# Patient Record
Sex: MALE | URBAN - METROPOLITAN AREA
[De-identification: names, ages, dates, MRNs, and addresses within clinical notes are randomized per-mention and may not be internally consistent; named-entity substitution may affect disease eponyms.]

---

## 2023-04-21 ENCOUNTER — OFFICE VISIT (OUTPATIENT)
Dept: URBAN - METROPOLITAN AREA CLINIC 22 | Facility: CLINIC | Age: 38
End: 2023-04-21
Payer: COMMERCIAL

## 2023-04-21 DIAGNOSIS — H02.401 PTOSIS OF RIGHT EYELID: ICD-10-CM

## 2023-04-21 DIAGNOSIS — H35.413 LATTICE DEGENERATION OF RETINA, BILATERAL: ICD-10-CM

## 2023-04-21 DIAGNOSIS — H33.323 BILATERAL ROUND HOLES OF RETINA: Primary | ICD-10-CM

## 2023-04-21 PROCEDURE — 99204 OFFICE O/P NEW MOD 45 MIN: CPT | Performed by: STUDENT IN AN ORGANIZED HEALTH CARE EDUCATION/TRAINING PROGRAM

## 2023-04-21 PROCEDURE — 92134 CPTRZ OPH DX IMG PST SGM RTA: CPT | Performed by: STUDENT IN AN ORGANIZED HEALTH CARE EDUCATION/TRAINING PROGRAM

## 2023-04-21 ASSESSMENT — INTRAOCULAR PRESSURE
OD: 12
OS: 12

## 2023-04-21 ASSESSMENT — VISUAL ACUITY
OS: 20/20
OD: 20/20

## 2023-04-21 NOTE — IMPRESSION/PLAN
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 Plan: Patient reports longstanding ptosis RUL. He has been using [employer], reports it does help improve field of vision. However he is also interested in surgery. Refer to oculoplastics for consultation.

## 2023-04-21 NOTE — IMPRESSION/PLAN
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 Plan: Discussed findings w/ patient. s/p laser repair for hole OD few yrs ago. New hole OD today, OS within lattice. Patient asymptomatic. Reviewed s/sx of RD and instructed patient to contact clinic ASAP if experiencing new floaters/flashes/curtain-veiling. Refer to retina for consultation.

## 2023-07-19 ENCOUNTER — OFFICE VISIT (OUTPATIENT)
Dept: URBAN - METROPOLITAN AREA CLINIC 23 | Facility: CLINIC | Age: 38
End: 2023-07-19
Payer: COMMERCIAL

## 2023-07-19 DIAGNOSIS — H35.413 LATTICE DEGENERATION OF RETINA, BILATERAL: Primary | ICD-10-CM

## 2023-07-19 PROCEDURE — 92134 CPTRZ OPH DX IMG PST SGM RTA: CPT | Performed by: OPHTHALMOLOGY

## 2023-07-19 PROCEDURE — 99204 OFFICE O/P NEW MOD 45 MIN: CPT | Performed by: OPHTHALMOLOGY

## 2023-07-19 ASSESSMENT — INTRAOCULAR PRESSURE
OS: 13
OD: 14

## 2023-07-19 NOTE — IMPRESSION/PLAN
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 Condition: new prob, no addtl w/u needed. Vision: vision not affected. s/p laser tx OD elsewhere for retinal hole Plan: Discussed diagnosis in detail with patient. Exam OD shows sup laser tx, lattice inf/nasally, no retinal tear and Exam OS shows lattice degeneration superior, lattice inferior, thinning within lattice. Discussed thinning within lattice, asymptotic, can be observed. Discussed signs and symptoms of PVD/floaters. Discussed signs and symptoms of retinal detachment. Come in ASAP if there is a change or decrease in vision. OCT OU shows stable. Optos OD shows sup/temp and inf/temp hole and Optos OS shows stable. Recommend an annual retina follow-up.